# Patient Record
Sex: FEMALE | Race: WHITE | NOT HISPANIC OR LATINO | URBAN - METROPOLITAN AREA
[De-identification: names, ages, dates, MRNs, and addresses within clinical notes are randomized per-mention and may not be internally consistent; named-entity substitution may affect disease eponyms.]

---

## 2018-02-12 ENCOUNTER — MOHS SURGERY-ROUTINE (OUTPATIENT)
Dept: URBAN - METROPOLITAN AREA CLINIC 7 | Facility: CLINIC | Age: 53
Setting detail: DERMATOLOGY
End: 2018-02-12

## 2018-02-12 DIAGNOSIS — L57.0 ACTINIC KERATOSIS: ICD-10-CM

## 2018-02-12 PROCEDURE — 17311 MOHS 1 STAGE H/N/HF/G: CPT

## 2018-02-12 PROCEDURE — 17312 MOHS ADDL STAGE: CPT

## 2018-09-17 ENCOUNTER — MOHS SURGERY-ROUTINE (OUTPATIENT)
Dept: URBAN - METROPOLITAN AREA CLINIC 7 | Facility: CLINIC | Age: 53
Setting detail: DERMATOLOGY
End: 2018-09-17

## 2018-09-17 DIAGNOSIS — C44.129 SQUAMOUS CELL CARCINOMA OF SKIN OF LEFT EYELID, INCLUDING CANTHUS: ICD-10-CM

## 2018-09-17 PROCEDURE — 12034 INTMD RPR S/TR/EXT 7.6-12.5: CPT

## 2018-09-17 PROCEDURE — 17313 MOHS 1 STAGE T/A/L: CPT

## 2018-09-17 PROCEDURE — 11602 EXC TR-EXT MAL+MARG 1.1-2 CM: CPT

## 2018-10-01 ENCOUNTER — SUTURE REMOVAL (OUTPATIENT)
Dept: URBAN - METROPOLITAN AREA CLINIC 7 | Facility: CLINIC | Age: 53
Setting detail: DERMATOLOGY
End: 2018-10-01

## 2018-10-01 DIAGNOSIS — L85.8 OTHER SPECIFIED EPIDERMAL THICKENING: ICD-10-CM

## 2018-10-01 DIAGNOSIS — L70.0 ACNE VULGARIS: ICD-10-CM

## 2018-10-01 PROCEDURE — 99024 POSTOP FOLLOW-UP VISIT: CPT

## 2020-03-09 ENCOUNTER — SPOT CHECK FOLLOW-UP (OUTPATIENT)
Dept: URBAN - METROPOLITAN AREA CLINIC 11 | Facility: CLINIC | Age: 55
Setting detail: DERMATOLOGY
End: 2020-03-09

## 2020-03-09 DIAGNOSIS — I83.11 VARICOSE VEINS OF RIGHT LOWER EXTREMITY WITH INFLAMMATION: ICD-10-CM

## 2020-03-09 PROCEDURE — 11102 TANGNTL BX SKIN SINGLE LES: CPT

## 2020-03-09 PROCEDURE — 99213 OFFICE O/P EST LOW 20 MIN: CPT

## 2020-03-09 PROCEDURE — 17261 DSTRJ MAL LES T/A/L .6-1.0CM: CPT

## 2020-08-17 ENCOUNTER — MOHS SURGERY-ROUTINE (OUTPATIENT)
Dept: URBAN - METROPOLITAN AREA CLINIC 11 | Facility: CLINIC | Age: 55
Setting detail: DERMATOLOGY
End: 2020-08-17

## 2020-08-17 DIAGNOSIS — D48.5 NEOPLASM OF UNCERTAIN BEHAVIOR OF SKIN: ICD-10-CM

## 2020-08-17 PROCEDURE — 15260 FTH/GFT FR N/E/E/L 20 SQCM/<: CPT

## 2020-08-17 PROCEDURE — 17312 MOHS ADDL STAGE: CPT

## 2020-08-17 PROCEDURE — 17311 MOHS 1 STAGE H/N/HF/G: CPT

## 2020-09-28 ENCOUNTER — MOHS SURGERY - FOLLOW UP (OUTPATIENT)
Dept: URBAN - METROPOLITAN AREA CLINIC 11 | Facility: CLINIC | Age: 55
Setting detail: DERMATOLOGY
End: 2020-09-28

## 2020-09-28 DIAGNOSIS — C44.42 SQUAMOUS CELL CARCINOMA OF SKIN OF SCALP AND NECK: ICD-10-CM

## 2020-09-28 PROCEDURE — 99024 POSTOP FOLLOW-UP VISIT: CPT

## 2021-01-11 ENCOUNTER — COMPLETE SKIN EXAM (OUTPATIENT)
Dept: URBAN - METROPOLITAN AREA CLINIC 11 | Facility: CLINIC | Age: 56
Setting detail: DERMATOLOGY
End: 2021-01-11

## 2021-01-11 DIAGNOSIS — L91.0 HYPERTROPHIC SCAR: ICD-10-CM

## 2021-01-11 DIAGNOSIS — Z85.828 PERSONAL HISTORY OF OTHER MALIGNANT NEOPLASM OF SKIN: ICD-10-CM

## 2021-01-11 PROCEDURE — 99213 OFFICE O/P EST LOW 20 MIN: CPT

## 2021-01-11 PROCEDURE — 11102 TANGNTL BX SKIN SINGLE LES: CPT

## 2021-02-22 ENCOUNTER — EXCISION (OUTPATIENT)
Dept: URBAN - METROPOLITAN AREA CLINIC 11 | Facility: CLINIC | Age: 56
Setting detail: DERMATOLOGY
End: 2021-02-22

## 2021-02-22 DIAGNOSIS — Z87.2 PERSONAL HISTORY OF DISEASES OF THE SKIN AND SUBCUTANEOUS TISSUE: ICD-10-CM

## 2021-02-22 DIAGNOSIS — D22.5 MELANOCYTIC NEVI OF TRUNK: ICD-10-CM

## 2021-02-22 DIAGNOSIS — L71.0 PERIORAL DERMATITIS: ICD-10-CM

## 2021-02-22 DIAGNOSIS — L81.4 OTHER MELANIN HYPERPIGMENTATION: ICD-10-CM

## 2021-02-22 DIAGNOSIS — L82.1 OTHER SEBORRHEIC KERATOSIS: ICD-10-CM

## 2021-02-22 PROCEDURE — 11602 EXC TR-EXT MAL+MARG 1.1-2 CM: CPT

## 2021-02-22 PROCEDURE — 12032 INTMD RPR S/A/T/EXT 2.6-7.5: CPT

## 2021-07-15 ENCOUNTER — COMPLETE SKIN EXAM (OUTPATIENT)
Dept: URBAN - METROPOLITAN AREA CLINIC 11 | Facility: CLINIC | Age: 56
Setting detail: DERMATOLOGY
End: 2021-07-15

## 2021-07-15 DIAGNOSIS — D04.112 CARCINOMA IN SITU OF SKIN OF RIGHT LOWER EYELID, INCLUDING CANTHUS: ICD-10-CM

## 2021-07-15 PROCEDURE — 99213 OFFICE O/P EST LOW 20 MIN: CPT

## 2022-11-10 ENCOUNTER — OFFICE VISIT (OUTPATIENT)
Dept: PODIATRY | Facility: CLINIC | Age: 57
End: 2022-11-10
Payer: COMMERCIAL

## 2022-11-10 VITALS
BODY MASS INDEX: 26.54 KG/M2 | SYSTOLIC BLOOD PRESSURE: 102 MMHG | HEART RATE: 61 BPM | HEIGHT: 63 IN | DIASTOLIC BLOOD PRESSURE: 64 MMHG | WEIGHT: 149.81 LBS

## 2022-11-10 DIAGNOSIS — L84 CORN OR CALLUS: ICD-10-CM

## 2022-11-10 DIAGNOSIS — M20.41 HAMMER TOE OF RIGHT FOOT: Primary | ICD-10-CM

## 2022-11-10 DIAGNOSIS — M21.621 TAILOR'S BUNION OF RIGHT FOOT: ICD-10-CM

## 2022-11-10 PROCEDURE — 99203 PR OFFICE/OUTPT VISIT, NEW, LEVL III, 30-44 MIN: ICD-10-PCS | Mod: S$GLB,,, | Performed by: PODIATRIST

## 2022-11-10 PROCEDURE — 99203 OFFICE O/P NEW LOW 30 MIN: CPT | Mod: S$GLB,,, | Performed by: PODIATRIST

## 2022-11-10 RX ORDER — CHOLECALCIFEROL (VITAMIN D3) 25 MCG
1000 TABLET ORAL
COMMUNITY

## 2022-11-10 RX ORDER — CALCIUM CARBONATE 600 MG
600 TABLET ORAL
COMMUNITY

## 2022-11-10 RX ORDER — CALCIUM CARB/VITAMIN D3/VIT K1 500-500-40
TABLET,CHEWABLE ORAL
COMMUNITY
Start: 2022-06-16

## 2022-11-10 RX ORDER — OXYBUTYNIN CHLORIDE 5 MG/1
5 TABLET, EXTENDED RELEASE ORAL EVERY MORNING
COMMUNITY
Start: 2022-10-27

## 2022-11-10 RX ORDER — METOPROLOL SUCCINATE 25 MG/1
12.5 TABLET, EXTENDED RELEASE ORAL DAILY
COMMUNITY
Start: 2022-10-27

## 2022-11-10 RX ORDER — CIPROFLOXACIN 500 MG/1
500 TABLET ORAL 2 TIMES DAILY
COMMUNITY
Start: 2022-06-16 | End: 2022-11-10

## 2022-11-10 NOTE — PROGRESS NOTES
Subjective:      Patient ID: Allyn Barnett is a 57 y.o. female.    Chief Complaint: Stacie Gruber is a 57 y.o. female who presents to the podiatry clinic  with complaint of  right foot pain. Onset of the symptoms was several weeks ago. Precipitating event: increased activity. Current symptoms include: ability to bear weight, but with some pain, worsening symptoms after a period of activity, and growth of plantar callous . Aggravating factors:  prolonged use/ extended work shifts . Symptoms have been intermittent. Patient has had no prior foot problems. Evaluation to date: none. Treatment to date: rest. Patients rates pain 0/10 on pain scale.    Review of Systems   Constitutional: Negative for chills and fever.   Cardiovascular:  Negative for chest pain and leg swelling.   Respiratory:  Negative for cough and shortness of breath.    Gastrointestinal:  Negative for diarrhea, nausea and vomiting.         Objective:      Physical Exam  Vitals reviewed.   Constitutional:       General: She is not in acute distress.     Appearance: Normal appearance. She is not ill-appearing.   HENT:      Nose: Nose normal.   Cardiovascular:      Rate and Rhythm: Normal rate.   Pulmonary:      Effort: Pulmonary effort is normal. No respiratory distress.   Skin:     Capillary Refill: Capillary refill takes 2 to 3 seconds.   Neurological:      Mental Status: She is alert and oriented to person, place, and time.   Psychiatric:         Mood and Affect: Mood normal.         Behavior: Behavior normal.         Thought Content: Thought content normal.         Judgment: Judgment normal.     Vascular:  DP and PT pulses palpable 2/4 bilateral foot, capillary refill time less than 3 seconds to distal aspect of the digits, skin temperature gradient within normal limits from proximal to distal bilateral foot   Neurologic:  Protective and light touch sensation intact bilateral lower extremity   Musculoskeletal:  5/5 muscle strength noted bilateral  foot, shortened bilateral 1st metatarsal, abnormal metatarsal parabola, lateral deviation of 5th metatarsal with prominent dorsal lateral eminence right 5th MPJ-tailor's bunionette, right 2nd digit is contracted with abducted distal phalanx, mild small probable ganglionic cyst noted to the lateral aspect of the right 2nd DIPJ  Dermatologic:  Nails 1 through 5 bilateral within normal limits length and thickness, no open lesions noted bilateral foot, diffuse hyperkeratotic skin lesion noted to the plantar aspect of the right 5th MPJ near tailor's bunion, mild hyperkeratotic skin lesion noted to the left 1st digit laterally, no rashes noted bilateral foot, no open lesions bilateral foot, very small probable ganglionic cyst right 2nd digit lateral DIPJ        Assessment:       Encounter Diagnoses   Name Primary?    Hammer toe of right foot Yes    Corn or callus     Tailor's bunion of right foot          Plan:       Allyn was seen today for callouses.    Diagnoses and all orders for this visit:    Hammer toe of right foot    Corn or callus    Tailor's bunion of right foot      I counseled the patient on her conditions, their implications and medical management.        1. Patient was examined and evaluated  2. Patient made aware of hammertoe to the right 2nd digit with possible presence of a ganglionic cyst.  Discussed etiology of hammertoe and possible conservative and surgical treatment options.  Also discussed etiology of ganglion cyst and conservative treatment options as well.  Discussed abnormal metatarsal parabola with the patient.  Patient made aware of a very short 1st ray which may be causing mild interdigital irritation.  3. Discussed with patient painful plantar callus plantar right 5th MPJ.  Discussed its association with mild tailor's bunion to the right 5th MPJ.  Patient was advised to increase toe box width and height of shoe gear and select shoe gear with soft stretchable uppers.  Patient was dispensed  offloading pads for reduction of pressure to the area.  Patient was advised to apply moisturizer/ointment to the area for softening purposes.  Patient was advised to discontinue use of current flip-flops and reduced barefoot walking.  4. Patient was advised to adjunct OTC analgesics for pain relief.  Patient will continue with comfortable shoe gear both inside and outside the home  5. Patient will follow-up p.r.n. for complaints